# Patient Record
(demographics unavailable — no encounter records)

---

## 2019-02-09 NOTE — NUR
Patient discharged with v/s stable. Written and verbal after care instructions 
given and explained to parent. Parent verbalized understanding. Carried by 
parent. All questions addressed prior to discharge. Advised to follow up with 
PMD.